# Patient Record
Sex: FEMALE | Race: BLACK OR AFRICAN AMERICAN | NOT HISPANIC OR LATINO | Employment: OTHER | URBAN - METROPOLITAN AREA
[De-identification: names, ages, dates, MRNs, and addresses within clinical notes are randomized per-mention and may not be internally consistent; named-entity substitution may affect disease eponyms.]

---

## 2023-01-26 LAB — CRC RECOMMENDATION EXT: NORMAL

## 2023-04-14 ENCOUNTER — OFFICE VISIT (OUTPATIENT)
Dept: PODIATRY | Facility: CLINIC | Age: 68
End: 2023-04-14
Payer: MEDICARE

## 2023-04-14 VITALS
SYSTOLIC BLOOD PRESSURE: 148 MMHG | HEART RATE: 79 BPM | BODY MASS INDEX: 34.8 KG/M2 | WEIGHT: 196.38 LBS | DIASTOLIC BLOOD PRESSURE: 92 MMHG | HEIGHT: 63 IN

## 2023-04-14 DIAGNOSIS — B35.3 TINEA PEDIS OF BOTH FEET: ICD-10-CM

## 2023-04-14 DIAGNOSIS — L60.9 DISEASE OF NAIL: Primary | ICD-10-CM

## 2023-04-14 DIAGNOSIS — L84 CORN OR CALLUS: ICD-10-CM

## 2023-04-14 DIAGNOSIS — M20.11 HALLUX VALGUS OF RIGHT FOOT: ICD-10-CM

## 2023-04-14 PROCEDURE — 99203 PR OFFICE/OUTPT VISIT, NEW, LEVL III, 30-44 MIN: ICD-10-PCS | Mod: S$GLB,,, | Performed by: PODIATRIST

## 2023-04-14 PROCEDURE — 99203 OFFICE O/P NEW LOW 30 MIN: CPT | Mod: S$GLB,,, | Performed by: PODIATRIST

## 2023-04-14 RX ORDER — BIOTIN 10 MG
TABLET ORAL
COMMUNITY

## 2023-04-14 RX ORDER — ASPIRIN 81 MG/1
TABLET ORAL
COMMUNITY
Start: 2023-04-05

## 2023-04-14 RX ORDER — CLOTRIMAZOLE AND BETAMETHASONE DIPROPIONATE 10; .64 MG/G; MG/G
CREAM TOPICAL 2 TIMES DAILY
Qty: 45 G | Refills: 2 | Status: SHIPPED | OUTPATIENT
Start: 2023-04-14

## 2023-04-14 RX ORDER — ACETAMINOPHEN 500 MG
TABLET ORAL
COMMUNITY

## 2023-04-14 RX ORDER — AMLODIPINE BESYLATE 10 MG/1
5 TABLET ORAL
COMMUNITY
Start: 2023-04-05 | End: 2024-04-04

## 2023-04-14 RX ORDER — LISINOPRIL AND HYDROCHLOROTHIAZIDE 12.5; 2 MG/1; MG/1
TABLET ORAL
COMMUNITY

## 2023-04-14 RX ORDER — EPINEPHRINE 0.3 MG/.3ML
0.3 INJECTION SUBCUTANEOUS
COMMUNITY
Start: 2022-09-17

## 2023-04-14 RX ORDER — ROSUVASTATIN CALCIUM 10 MG/1
10 TABLET, COATED ORAL
COMMUNITY
Start: 2023-04-05 | End: 2024-04-04

## 2023-04-14 RX ORDER — DIPHENHYDRAMINE HCL 25 MG
25 CAPSULE ORAL
COMMUNITY
Start: 2022-09-21 | End: 2023-09-21

## 2023-04-14 RX ORDER — METOPROLOL TARTRATE 25 MG/1
TABLET, FILM COATED ORAL
COMMUNITY
Start: 2023-04-05

## 2023-04-14 NOTE — PROGRESS NOTES
Subjective:     Patient ID: Jo Ann Hummel is a 67 y.o. female.    Chief Complaint: Erin Cherry is a 67 y.o. female who presents to the clinic complaining of thick and discolored toenails on both feet. Jo Ann is inquiring about treatment options.  Patient also complains of reduced motion at the right 1st MPJ secondary to previous bunion correction.  Patient also complains of dry itchy skin bilateral foot.  Patient states she is having a lot of discomfort from the growth of plantar calluses on the bottom of each foot.  Patient rates current pain 8/10.    Review of Systems   Constitutional: Negative for chills and fever.   Cardiovascular:  Negative for chest pain and leg swelling.   Respiratory:  Negative for cough and shortness of breath.    Gastrointestinal:  Negative for diarrhea, nausea and vomiting.      Objective:     Physical Exam  Vitals reviewed.   Constitutional:       General: She is not in acute distress.     Appearance: Normal appearance. She is not ill-appearing.   HENT:      Head: Normocephalic.      Nose: Nose normal.   Cardiovascular:      Rate and Rhythm: Normal rate.   Pulmonary:      Effort: Pulmonary effort is normal. No respiratory distress.   Skin:     Capillary Refill: Capillary refill takes 2 to 3 seconds.   Neurological:      Mental Status: She is alert and oriented to person, place, and time.   Psychiatric:         Mood and Affect: Mood normal.         Behavior: Behavior normal.         Thought Content: Thought content normal.         Judgment: Judgment normal.     Musculoskeletal:  5/5 muscle strength noted bilateral foot, ankle joint range of motion is mildly reduced without pain, right 1st MPJ range of motion is reduced with no pain, pain and tenderness with palpation plantar aspect bilateral 2nd MPJ in the left 5th MPJ at site of hyperkeratotic skin lesions, no masses noted bilateral foot   Vascular: DP and PT pulses palpable 1/4 bilateral foot, capillary refill time less 3  seconds to distal aspect of the digits bilateral foot, no edema noted bilateral foot  Neurologic:  Protective and light touch sensation intact bilateral lower extremity   Dermatologic:  Nails 1 through 5 bilateral are severely discolored, thickened and elongated.  Dry, flaky, erythematous skin noted in a moccasin distribution bilateral plantar foot, no interdigital maceration noted bilateral foot.  Diffuse hyperkeratotic skin lesions noted on the plantar aspect of the bilateral 2nd MPJ and left 5th MPJ, well-healed surgical scar from previous bunion correction right 1st MPJ    Assessment:      Encounter Diagnoses   Name Primary?    Disease of nail Yes    Tinea pedis of both feet     Corn or callus     Hallux valgus of right foot      Plan:     [facility]    Diagnoses and all orders for this visit:    Disease of nail    Tinea pedis of both feet  -     clotrimazole-betamethasone 1-0.05% (LOTRISONE) cream; Apply topically 2 (two) times daily.    Corn or callus    Hallux valgus of right foot      I counseled the patient on her conditions, their implications and medical management.        1. Patient was examined and evaluated.    2. Discussed patient etiology of onychomycosis.  Conservative OTC topical treatments such as Vicks vapor rub and tea tree oil were discussed in detail with the patient.  Discussed the risks and benefits oral Lamisil therapy with the patient.    3. Discussed patient etiology of tinea pedis.  Patient was advised of proper use of Lotrisone cream.    4. Discussed with patient stiffness from previous right bunion correction.  Patient was advised to continue with active and passive range of motion of the digit for improvement of flexibility.  Patient was advised to adjust shoe gear for better comfort and fit including increased toe box height and width and selection of shoe gear with soft stretchable uppers.    5. Discussed with patient etiology of plantar callus formation.   Patient was advised to apply ointments/moisturizer to the areas for softening purposes.  Patient was advised to perform safe serial debridements at home with the use of a pumice stone, nail file, emery board.  Patient was dispensed offloading aperture pads for prevention of direct contact to the area.  Patient was warned of potential recurrence over time.    6. Patient was advised to continue with comfortable shoe gear both inside and outside the home.  Patient will limit barefoot walking.  Patient will reduce the amount of use of flip-flops and slides.    7. Patient will follow-up p.r.n. for foot complaints

## 2023-12-08 ENCOUNTER — OFFICE VISIT (OUTPATIENT)
Dept: PODIATRY | Facility: CLINIC | Age: 68
End: 2023-12-08
Payer: MEDICARE

## 2023-12-08 VITALS — HEIGHT: 63 IN | BODY MASS INDEX: 34.73 KG/M2 | WEIGHT: 196 LBS

## 2023-12-08 DIAGNOSIS — M20.11 HALLUX VALGUS OF RIGHT FOOT: ICD-10-CM

## 2023-12-08 DIAGNOSIS — L84 CORN OR CALLUS: ICD-10-CM

## 2023-12-08 DIAGNOSIS — I73.9 ASYMPTOMATIC PVD (PERIPHERAL VASCULAR DISEASE): ICD-10-CM

## 2023-12-08 DIAGNOSIS — L60.9 DISEASE OF NAIL: Primary | ICD-10-CM

## 2023-12-08 PROCEDURE — 99213 OFFICE O/P EST LOW 20 MIN: CPT | Mod: S$GLB,,, | Performed by: PODIATRIST

## 2023-12-08 RX ORDER — METOPROLOL SUCCINATE 50 MG/1
50 TABLET, EXTENDED RELEASE ORAL EVERY MORNING
COMMUNITY
Start: 2023-12-06

## 2023-12-08 RX ORDER — GABAPENTIN 100 MG/1
100 CAPSULE ORAL
COMMUNITY
Start: 2023-12-07

## 2023-12-08 RX ORDER — ATORVASTATIN CALCIUM 10 MG/1
10 TABLET, FILM COATED ORAL EVERY MORNING
COMMUNITY
Start: 2023-12-06

## 2023-12-08 RX ORDER — ERGOCALCIFEROL 1.25 MG/1
50000 CAPSULE ORAL
COMMUNITY
Start: 2023-11-15

## 2023-12-08 RX ORDER — HYDROCHLOROTHIAZIDE 12.5 MG/1
12.5 CAPSULE ORAL
COMMUNITY
Start: 2023-12-07 | End: 2024-12-06

## 2024-01-08 NOTE — PROGRESS NOTES
Subjective:     Patient ID: Jo Ann Hummel is a 68 y.o. female.    Chief Complaint: Foot Pain    Jo Ann is a 68 y.o. female who presents to the podiatry clinic  with complaint of  bilateral foot pain. Onset of the symptoms was several months ago. Precipitating event: increased activity and growth of painful calluses . Current symptoms include: ability to bear weight, but with some pain. Aggravating factors: walking. Symptoms have gradually worsened. Patient has had prior foot problems. Treatment to date:  prior debridement . Patients rates pain 10/10 on pain scale.    Review of Systems   Constitutional: Negative for chills and fever.   Cardiovascular:  Positive for leg swelling. Negative for chest pain.   Respiratory:  Negative for cough and shortness of breath.    Gastrointestinal:  Negative for diarrhea, nausea and vomiting.        Objective:     Physical Exam  Vitals reviewed.   Constitutional:       General: She is not in acute distress.     Appearance: Normal appearance. She is not ill-appearing.   HENT:      Nose: Nose normal.   Cardiovascular:      Rate and Rhythm: Normal rate.   Pulmonary:      Effort: Pulmonary effort is normal. No respiratory distress.   Skin:     Capillary Refill: Capillary refill takes 2 to 3 seconds.   Neurological:      Mental Status: She is alert and oriented to person, place, and time.   Psychiatric:         Mood and Affect: Mood normal.         Behavior: Behavior normal.         Thought Content: Thought content normal.         Judgment: Judgment normal.     Musculoskeletal:  5/5 muscle strength noted bilateral foot, ankle joint range of motion is mildly reduced without pain, right 1st MPJ range of motion is reduced with no pain, pain and tenderness with palpation plantar aspect bilateral 2nd MPJ in the left 5th MPJ at site of hyperkeratotic skin lesions, no masses noted bilateral foot   Vascular: DP and PT pulses palpable 1/4 bilateral foot, capillary refill time less 3 seconds  to distal aspect of the digits bilateral foot, no edema noted bilateral foot  Neurologic:  Protective and light touch sensation intact bilateral lower extremity   Dermatologic:  Nails 1 through 5 bilateral are severely discolored, thickened and elongated. no interdigital maceration noted bilateral foot.  hyperkeratotic skin lesions noted on the plantar aspect of the bilateral 2nd MPJ and left 5th MPJ, well-healed surgical scar from previous bunion correction right 1st MPJ      Assessment:       Disease of nail    Corn or callus    Hallux valgus of right foot    Asymptomatic PVD (peripheral vascular disease)      Plan:     Jo Ann was seen today for foot pain.    Diagnoses and all orders for this visit:    Disease of nail    Corn or callus    Hallux valgus of right foot    Asymptomatic PVD (peripheral vascular disease)      I counseled the patient on her conditions, their implications and medical management.        1. Patient was examined and evaluated.    2. Again Discussed with patient etiology of onychomycosis.  Conservative OTC topical treatments such as Vicks vapor rub and tea tree oil were discussed in detail with the patient.  Discussed the risks and benefits oral Lamisil therapy with the patient.    3. Discussed with patient stiffness from previous right bunion correction.  Patient was advised to continue with active and passive range of motion of the digit for improvement of flexibility.  Patient was advised to adjust shoe gear for better comfort and fit including increased toe box height and width and selection of shoe gear with soft stretchable uppers.    4. Again Discussed with patient etiology of plantar callus formation.  Patient was advised to apply ointments/moisturizer to the areas for softening purposes.  Patient was advised to perform safe debridement at home.  Patient was dispensed offloading aperture pads for prevention of direct contact to the area.  Patient was warned of potential recurrence  over time.    6. Patient was advised to continue with comfortable shoe gear both inside and outside the home.  Patient will limit barefoot walking.  Patient will reduce the amount of use of flip-flops and slides.    7. Patient will follow-up p.r.n. for foot complaints

## 2024-03-14 ENCOUNTER — PATIENT OUTREACH (OUTPATIENT)
Dept: ADMINISTRATIVE | Facility: HOSPITAL | Age: 69
End: 2024-03-14
Payer: MEDICARE

## 2024-03-14 NOTE — PROGRESS NOTES
Population Health Chart Review & Patient Outreach Details      Additional [facility] Health Notes:               Updates Requested / Reviewed:      Care Everywhere, , External Sources: [place], and Care Team Updated         Health Maintenance Topics Overdue:      VBHM Score: 0     Patient is not due for any topics at this time.    Influenza Vaccine  Pneumonia Vaccine  Shingles/Zoster Vaccine  RSV Vaccine                  Health Maintenance Topic(s) Outreach Outcomes & Actions Taken:    Colorectal Cancer Screening - Outreach Outcomes & Actions Taken  : External Records Uploaded, Care Team Updated, & History Updated if Applicable

## 2024-05-17 ENCOUNTER — OFFICE VISIT (OUTPATIENT)
Dept: PODIATRY | Facility: CLINIC | Age: 69
End: 2024-05-17
Payer: MEDICARE

## 2024-05-17 VITALS — BODY MASS INDEX: 33.66 KG/M2 | HEIGHT: 63 IN | WEIGHT: 190 LBS

## 2024-05-17 DIAGNOSIS — S93.601A FOOT SPRAIN, RIGHT, INITIAL ENCOUNTER: Primary | ICD-10-CM

## 2024-05-17 PROCEDURE — 29580 STRAPPING UNNA BOOT: CPT | Mod: RT,S$GLB,, | Performed by: PODIATRIST

## 2024-05-17 PROCEDURE — 99213 OFFICE O/P EST LOW 20 MIN: CPT | Mod: 25,S$GLB,, | Performed by: PODIATRIST

## 2024-05-17 RX ORDER — FUROSEMIDE 20 MG/1
20 TABLET ORAL
COMMUNITY
Start: 2024-04-11 | End: 2025-04-11

## 2024-05-17 RX ORDER — POTASSIUM CHLORIDE 750 MG/1
10 TABLET, EXTENDED RELEASE ORAL EVERY MORNING
COMMUNITY
Start: 2024-04-11

## 2024-05-17 RX ORDER — TRIAMCINOLONE ACETONIDE 1 MG/G
CREAM TOPICAL 2 TIMES DAILY
COMMUNITY
Start: 2024-01-08

## 2024-05-17 RX ORDER — GABAPENTIN 300 MG/1
300 CAPSULE ORAL 2 TIMES DAILY PRN
COMMUNITY
Start: 2024-04-11

## 2024-05-28 ENCOUNTER — OFFICE VISIT (OUTPATIENT)
Dept: PODIATRY | Facility: CLINIC | Age: 69
End: 2024-05-28
Payer: MEDICARE

## 2024-05-28 VITALS — BODY MASS INDEX: 33.66 KG/M2 | HEIGHT: 63 IN | WEIGHT: 190 LBS

## 2024-05-28 DIAGNOSIS — S93.601D FOOT SPRAIN, RIGHT, SUBSEQUENT ENCOUNTER: Primary | ICD-10-CM

## 2024-05-28 PROCEDURE — 99213 OFFICE O/P EST LOW 20 MIN: CPT | Mod: S$GLB,,, | Performed by: PODIATRIST

## 2024-05-28 RX ORDER — LIDOCAINE 50 MG/G
1 PATCH TOPICAL
COMMUNITY

## 2024-06-10 NOTE — PROCEDURES
Procedures  UNNA BOOT APPLICATION RIGHT LOWER EXTREMITY    Patient had a 4 in unna boot elastic conforming bandage applied to the right lower extremity from just proximal to the metatarsophalangeal joint region extending proximally in his when she will compressive manner to above the ankle.  Next patient had roll gauze and CoFlex applied in a similar yet non restrictive manner.  Patient was advised to keep this dressing clean, dry, and intact for 1-3 days.

## 2024-06-10 NOTE — PROGRESS NOTES
Subjective:     Patient ID: Jo Ann Hummel is a 69 y.o. female.    Chief Complaint: Foot Pain    Jo Ann is a 69 y.o. female who presents to the podiatry clinic  with complaint of  right foot pain. Onset of the symptoms was several days ago. Precipitating event: injured right foot . Current symptoms include: ability to bear weight, but with some pain, swelling, and worsening symptoms after a period of activity. Aggravating factors: standing and walking. Symptoms have gradually improved. Patient has had prior foot problems.  Treatment to date: OTC analgesics which are somewhat effective and rest. Patients rates pain 2/10 on pain scale.    Review of Systems   Constitutional: Negative for chills and fever.   Cardiovascular:  Negative for chest pain and leg swelling.   Respiratory:  Negative for cough and shortness of breath.    Gastrointestinal:  Negative for diarrhea, nausea and vomiting.        Objective:     Physical Exam  Vitals reviewed.   Constitutional:       General: She is not in acute distress.     Appearance: Normal appearance. She is not ill-appearing.   HENT:      Head: Normocephalic.      Nose: Nose normal.   Pulmonary:      Effort: Pulmonary effort is normal. No respiratory distress.   Skin:     Capillary Refill: Capillary refill takes 2 to 3 seconds.   Neurological:      Mental Status: She is alert and oriented to person, place, and time.   Psychiatric:         Mood and Affect: Mood normal.         Behavior: Behavior normal.         Thought Content: Thought content normal.         Judgment: Judgment normal.       Musculoskeletal:  5/5 muscle strength noted bilateral foot, ankle joint range of motion is mildly reduced without pain, right 1st MPJ range of motion is reduced with no pain, pain and tenderness with palpation plantar aspect bilateral 2nd MPJ in the left 5th MPJ at site of hyperkeratotic skin lesions, no masses noted bilateral foot, pain and tenderness with range of motion and palpation of  the right foot and inversion eversion and dorsiflexion plantar flexion   Vascular: DP and PT pulses palpable 1/4 bilateral foot, capillary refill time less 3 seconds to distal aspect of the digits bilateral foot, no edema noted bilateral foot  Neurologic:  Protective and light touch sensation intact bilateral lower extremity   Dermatologic:  Nails 1 through 5 bilateral are severely discolored, thickened and elongated. no interdigital maceration noted bilateral foot.  hyperkeratotic skin lesions noted on the plantar aspect of the bilateral 2nd MPJ and left 5th MPJ, well-healed surgical scar from previous bunion correction right 1st MPJ, mild swelling noted to the right foot and ankle    Assessment:      Encounter Diagnosis   Name Primary?    Foot sprain, right, initial encounter Yes     Plan:     Jo Ann was seen today for foot pain.    Diagnoses and all orders for this visit:    Foot sprain, right, initial encounter      I counseled the patient on her conditions, their implications and medical management.        1. Patient was examined and evaluated.    2. Discussed patient etiology of foot strain to the right foot.  Patient was advised to decrease the amount of activity and to perform DUANE CE therapy at home.  Discussed with patient potential benefits of local corticosteroid injection oral Medrol Dosepak shoe pain complaints worsen over time.  Patient will continue to adjunct with OTC analgesics/NSAIDs for pain relief.    3. Patient had a unna boot applied to the right lower extremity.  Patient will keep this dressing clean, dry, and intact for 1-3 days.    4. Patient will follow-up in 1 week or p.r.n. for complaints

## 2024-06-18 NOTE — PROGRESS NOTES
Subjective:     Patient ID: Jo Ann Hummel is a 69 y.o. female.    Chief Complaint: Follow-up    Jo Ann is a 69 y.o. female who presents to the podiatry clinic  with complaint of  right foot pain.  Onset of the symptoms was several days ago. Precipitating event: injured right foot . Current symptoms include: ability to bear weight, but with some pain, swelling, and worsening symptoms after a period of activity. Aggravating factors: standing and walking. Symptoms have gradually improved. Patient has had prior foot problems.  Treatment to date: OTC analgesics which are somewhat effective , unna boot, and PRICE therapy. Patients rates pain 0/10 on pain scale.       Review of Systems   Constitutional: Negative for chills and fever.   Cardiovascular:  Positive for leg swelling. Negative for chest pain.   Respiratory:  Negative for cough and shortness of breath.    Gastrointestinal:  Negative for diarrhea, nausea and vomiting.        Objective:     Physical Exam  Vitals reviewed.   Constitutional:       General: She is not in acute distress.     Appearance: Normal appearance. She is not ill-appearing.   HENT:      Nose: Nose normal.   Cardiovascular:      Rate and Rhythm: Normal rate.   Pulmonary:      Effort: Pulmonary effort is normal. No respiratory distress.   Skin:     Capillary Refill: Capillary refill takes 2 to 3 seconds.   Neurological:      Mental Status: She is alert and oriented to person, place, and time.   Psychiatric:         Mood and Affect: Mood normal.         Behavior: Behavior normal.         Thought Content: Thought content normal.         Judgment: Judgment normal.       Musculoskeletal:  5/5 muscle strength noted bilateral foot, ankle joint range of motion is mildly reduced without pain, right 1st MPJ range of motion is reduced with no pain, mild to minimal pain and tenderness with palpation plantar aspect bilateral 2nd MPJ in the left 5th MPJ at site of hyperkeratotic skin lesions, no masses  noted bilateral foot, NO pain and tenderness with range of motion and palpation of the right foot and inversion eversion and dorsiflexion plantar flexion   Vascular: DP and PT pulses palpable 1/4 bilateral foot, capillary refill time less 3 seconds to distal aspect of the digits bilateral foot, no edema noted bilateral foot  Neurologic:  Protective and light touch sensation intact bilateral lower extremity   Dermatologic:  Nails 1 through 5 bilateral are severely discolored, thickened and elongated. no interdigital maceration noted bilateral foot.  hyperkeratotic skin lesions noted on the plantar aspect of the bilateral 2nd MPJ and left 5th MPJ, well-healed surgical scar from previous bunion correction right 1st MPJ, mild swelling noted to the right foot and ankle    Assessment:      Encounter Diagnosis   Name Primary?    Foot sprain, right, subsequent encounter Yes     Plan:     Jo Ann was seen today for follow-up.    Diagnoses and all orders for this visit:    Foot sprain, right, subsequent encounter      I counseled the patient on her conditions, their implications and medical management.          1. Patient was examined and evaluated.    2. Again Discussed with patient etiology of foot strain to the right foot.  Patient was advised to decrease the amount of activity and to perform DUANE CE therapy at home.  Discussed with patient potential benefits of local corticosteroid injection oral Medrol Dosepak shoe pain complaints worsen over time.  Patient will continue to adjunct with OTC analgesics/NSAIDs for pain relief.    3. Patient will follow-up p.r.n. for complaints

## 2024-08-22 ENCOUNTER — OFFICE VISIT (OUTPATIENT)
Dept: PODIATRY | Facility: CLINIC | Age: 69
End: 2024-08-22
Payer: MEDICARE

## 2024-08-22 VITALS — HEIGHT: 63 IN | WEIGHT: 190 LBS | BODY MASS INDEX: 33.66 KG/M2

## 2024-08-22 DIAGNOSIS — L84 CORN OR CALLUS: Primary | ICD-10-CM

## 2024-08-22 DIAGNOSIS — B07.0 PLANTAR WART: ICD-10-CM

## 2024-08-22 PROCEDURE — 99213 OFFICE O/P EST LOW 20 MIN: CPT | Mod: S$GLB,,, | Performed by: PODIATRIST

## 2024-08-22 NOTE — PROGRESS NOTES
Subjective:     Patient ID: Jo Ann Hummel is a 69 y.o. female    Chief Complaint: Erin Cherry is a 69 y.o. female who presents to the podiatry clinic  with complaint of  right foot pain. Onset of the symptoms was several weeks ago. Precipitating event: increased activity and regrowth of painful plantar soft tissue lesion . Current symptoms include: ability to bear weight, but with some pain and worsening symptoms after a period of activity. Aggravating factors: walking and barefoot walking and direct contact to soft tissue lesion . Symptoms have waxed and waned but are better overall. Patient has had prior foot problems.  Treatment to date: avoidance of offending activity and prior debridement of soft tissue lesion . Patients rates pain 4/10 on pain scale.    Review of Systems   Constitutional: Negative.  Negative for chills and fever.   Respiratory:  Negative for cough and shortness of breath.    Cardiovascular:  Negative for chest pain and leg swelling.   Gastrointestinal:  Negative for diarrhea, nausea and vomiting.   Neurological:  Negative for tingling.        Objective:   Physical Exam  Vitals reviewed.   Constitutional:       General: She is not in acute distress.     Appearance: Normal appearance. She is not ill-appearing.   HENT:      Head: Normocephalic.      Nose: Nose normal.   Cardiovascular:      Pulses:           Dorsalis pedis pulses are 2+ on the right side and 2+ on the left side.        Posterior tibial pulses are 2+ on the right side and 2+ on the left side.   Pulmonary:      Effort: Pulmonary effort is normal. No respiratory distress.   Musculoskeletal:      Right foot: Bunion present.   Feet:      Right foot:      Skin integrity: Callus present.   Skin:     Capillary Refill: Capillary refill takes 2 to 3 seconds.   Neurological:      Mental Status: She is alert and oriented to person, place, and time.   Psychiatric:         Mood and Affect: Mood normal.         Behavior: Behavior  normal.         Thought Content: Thought content normal.         Judgment: Judgment normal.        Foot Exam    General  General Appearance: appears stated age and healthy   Orientation: alert and oriented to person, place, and time   Affect: appropriate       Right Foot/Ankle     Inspection and Palpation  Hammertoes: second toe  Hallux valgus: yes  Skin Exam: callus and corn (plantar right 2nd MPJ);     Neurovascular  Dorsalis pedis: 2+  Posterior tibial: 2+    Muscle Strength  Ankle dorsiflexion: 5  Ankle plantar flexion: 5  Ankle inversion: 5  Ankle eversion: 5  Great toe extension: 5  Great toe flexion: 5      Left Foot/Ankle      Neurovascular  Dorsalis pedis: 2+  Posterior tibial: 2+    Muscle Strength  Ankle dorsiflexion: 5  Ankle plantar flexion: 5  Ankle inversion: 5  Ankle eversion: 5  Great toe extension: 5  Great toe flexion: 5           Assessment:         1. Corn or callus    2. Plantar wart       Plan:     Jo Ann Hummel was seen today for   Chief Complaint   Patient presents with    Callouses       Assessment & Plan           Procedures     1. Patient was examined and evaluated.    2. Discussed with patient etiology of IPK versus verruca versus plantar calluses. Discussed with patient etiology of callus formation.  Patient was warned of potential recurrence over time.  Patient was dispensed offloading pads for reduction of direct contact to the lesion.  Patient was advised to perform safe debridement at home with a emery board, nail file or pumice stone.  Patient was advised to apply ointments / moisturizer to the area for softening purposes. Discussed with patient etiology of plantar verruca.  Discussed with patient several conservative treatment options for verruca including cryotherapy, surgical excision, cauterization and debridement with topical acid treatments.  Patient was warned of potential recurrence over time.  Patient was dispensed offloading aperture pads for prevention of direct  contact.    3. Patient was advised to decrease the amount of use of backless shoes, slides, and flip-flops.  Patient will also limit barefoot walking.  4. Discussed with patient the etiology of nail fungus and as possible secondary issue to prior nail trauma.  Discussed with patient OTC topical conservative treatments such as Vicks vapor rub, tea tree oil as well as coconut oil.  Discussed with patient risks and benefits oral Lamisil therapy.   5. Patient was advised to discontinue use of fake toenails on right 1st digit.  6. Discussed with patient etiology of bunion deformity/hallux abductovalgus.  Discussed conservative treatment options with the patient.  Patient was advised to adjust shoe gear for better comfort and fit including increased toe box height and width and selection shoe gear with soft stretchable uppers.  Discussed with patient potential benefits of a local corticosteroid injection or oral Medrol Dosepak for improvement of pain and inflammation.  Patient was advised that she can adjunct with NSAIDs for pain relief as well.  Briefly discussed with patient surgical intervention including Ozzie bunionectomy.    Patient was advised of the etiology of hammertoe deformity.  Patient was advised to adjust shoe gear for better comfort and fit including increased toe box height and width and selection shoe gear with soft stretchable uppers.  7. Patient will follow-up p.r.n. for complaints          Lola Mendoza DPM  [id number] [id number] [phone]      This note was created using [employer] direct voice recognition software. Note may have occasional typographical errors that may not have been identified and edited despite initial review prior to signing.

## 2024-11-21 ENCOUNTER — OFFICE VISIT (OUTPATIENT)
Dept: PODIATRY | Facility: CLINIC | Age: 69
End: 2024-11-21
Payer: MEDICARE

## 2024-11-21 VITALS — WEIGHT: 190 LBS | BODY MASS INDEX: 33.66 KG/M2 | HEIGHT: 63 IN

## 2024-11-21 DIAGNOSIS — I73.9 ASYMPTOMATIC PVD (PERIPHERAL VASCULAR DISEASE): ICD-10-CM

## 2024-11-21 DIAGNOSIS — L60.9 DISEASE OF NAIL: Primary | ICD-10-CM

## 2024-11-21 DIAGNOSIS — L84 CORN OR CALLUS: ICD-10-CM

## 2024-11-21 RX ORDER — SEMAGLUTIDE 0.25 MG/.5ML
0.25 INJECTION, SOLUTION SUBCUTANEOUS
COMMUNITY
Start: 2024-11-07

## 2024-11-21 NOTE — PROGRESS NOTES
Subjective:     Patient ID: Jo Ann Hummel is a 69 y.o. female    Chief Complaint: Nail Care       Jo Ann is a 69 y.o. female who presents to the clinic for evaluation and treatment of high risk feet. Jo Ann has a past medical history of Personal history of colonic polyps. The patient's chief complaint is long, thick toenails. This patient has documented high risk feet requiring routine maintenance secondary to peripheral vascular disease.    PCP: Cristi Locke MD    Date Last Seen by PCP: 10/03/2024    Current shoe gear:  Affected Foot: Tennis shoes     Unaffected Foot: Tennis shoes    Last encounter in this department: 8/22/2024    Hemoglobin A1C   Date Value Ref Range Status   02/29/2024 5.8 (H) <5.7 % Final     Comment:       Prediabetic: 5.7 - 6.4 %  Diabetic: > 6.4 %   01/06/2023 6.0 5.7 - 6.4 % Final       Review of Systems   Constitutional: Negative.  Negative for chills and fever.   Respiratory:  Negative for cough and shortness of breath.    Cardiovascular:  Negative for chest pain and leg swelling.   Gastrointestinal:  Negative for diarrhea, nausea and vomiting.   Neurological:  Positive for tingling.        Objective:   Physical Exam  Vitals reviewed.   Constitutional:       General: She is not in acute distress.     Appearance: Normal appearance. She is not ill-appearing.   HENT:      Head: Normocephalic.      Nose: Nose normal.   Cardiovascular:      Pulses:           Dorsalis pedis pulses are 1+ on the right side and 1+ on the left side.        Posterior tibial pulses are 1+ on the right side and 1+ on the left side.   Pulmonary:      Effort: Pulmonary effort is normal. No respiratory distress.   Feet:      Right foot:      Skin integrity: Callus (plantar right 2nd MPJ) present.      Left foot:      Skin integrity: Callus (plantar left 2nd MPJ) present.   Skin:     Capillary Refill: Capillary refill takes 2 to 3 seconds.   Neurological:      Mental Status: She is alert and oriented to  person, place, and time.   Psychiatric:         Mood and Affect: Mood normal.         Behavior: Behavior normal.         Thought Content: Thought content normal.         Judgment: Judgment normal.        Foot Exam    General  General Appearance: appears stated age and healthy   Orientation: alert and oriented to person, place, and time   Affect: appropriate       Right Foot/Ankle     Inspection and Palpation  Tenderness: lesser metatarsophalangeal joints   Swelling: dorsum   Skin Exam: callus (plantar right 2nd MPJ), skin changes and abnormal color;     Neurovascular  Dorsalis pedis: 1+  Posterior tibial: 1+    Muscle Strength  Ankle dorsiflexion: 5  Ankle plantar flexion: 5  Ankle inversion: 5  Ankle eversion: 5  Great toe extension: 5  Great toe flexion: 5      Left Foot/Ankle      Inspection and Palpation  Swelling: dorsum   Skin Exam: callus (plantar left 2nd MPJ), skin changes and abnormal color;     Neurovascular  Dorsalis pedis: 1+  Posterior tibial: 1+    Muscle Strength  Ankle dorsiflexion: 5  Ankle plantar flexion: 5  Ankle inversion: 5  Ankle eversion: 5  Great toe extension: 5  Great toe flexion: 5        Vascular:  Pedal hair growth is absent to the digits bilateral lower extremity, skin temperature gradient warm to cool from proximal distal bilateral lower extremity, mild atrophic soft tissue skin changes noted bilateral foot,  mild edema noted bilateral foot  Neurologic:  Protective and light touch sensation intact bilateral lower extremity   Dermatologic:  Nails 1 through 5 bilateral are severely discolored, thickened and elongated. hyperkeratotic skin lesions noted on the plantar aspect of the bilateral 2nd MPJ and left 5th MPJ, well-healed surgical scar from previous bunion correction right 1st MPJ    Assessment:         1. Disease of nail    2. Asymptomatic PVD (peripheral vascular disease)       Plan:     Jo Ann Hummel was seen today for   Chief Complaint   Patient presents with    Nail Care        Assessment & Plan           Routine Foot Care    Date/Time: 11/21/2024 1:30 PM    Performed by: Bonnie Mendoza DPM  Authorized by: Bonnie Mendoza DPM    Consent Done?:  Yes (Verbal)  Hyperkeratotic Skin Lesions?: Yes    Number of trimmed lesions:  2  Location(s):  Left 2nd Metatarsal Head and Right 2nd Metatarsal Head    Nail Care Type:  Debride(Left 1st Toe, Left 5th Toe, Left 2nd Toe, Left 3rd Toe, Right 2nd Toe, Left 4th Toe, Right 3rd Toe, Right 4th Toe and Right 5th Toe)  Patient tolerance:  Patient tolerated the procedure well with no immediate complications       1. Patient was examined and evaluated.    2. Discussed with patient etiology of asymptomatic peripheral vascular disease.  Patient was advised elevate lower extremity rest consider daily use of compression stockings. Patient will monitor dietary salt intake and water consumption.    3. Discussed with patient etiology of callus formation.  Patient was warned of potential recurrence over time.  Patient was dispensed offloading pads for reduction of direct contact to the lesion.  Patient was advised to perform safe debridement at home with a emery board, nail file or pumice stone.  Patient was advised to apply ointments / moisturizer to the area for softening purposes.  4. Discussed with patient the etiology of nail fungus and as possible secondary issue to prior nail trauma.  Discussed with patient OTC topical conservative treatments such as Vicks vapor rub, tea tree oil as well as coconut oil.  Discussed with patient risks and benefits oral Lamisil therapy.  Discussed benefits of prescription solutions Penlac and Jublia.  Patient was encouraged to be very careful with application of false nail plates to the foot with a current false nail plate on the right 1st digit  5. Patient will follow-up in 3 months or p.r.n. for complaints         Bonnie Mendoza DPM  [address] [facility] [address]  [place] [address]  [phone]      This note  was created using [employer] direct voice recognition software. Note may have occasional typographical errors that may not have been identified and edited despite initial review prior to signing.

## 2024-12-02 ENCOUNTER — APPOINTMENT (RX ONLY)
Dept: URBAN - METROPOLITAN AREA CLINIC 79 | Facility: CLINIC | Age: 69
Setting detail: DERMATOLOGY
End: 2024-12-02

## 2024-12-02 DIAGNOSIS — L82.0 INFLAMED SEBORRHEIC KERATOSIS: ICD-10-CM

## 2024-12-02 DIAGNOSIS — D18.0 HEMANGIOMA: ICD-10-CM

## 2024-12-02 DIAGNOSIS — L82.1 OTHER SEBORRHEIC KERATOSIS: ICD-10-CM

## 2024-12-02 DIAGNOSIS — Z85.828 PERSONAL HISTORY OF OTHER MALIGNANT NEOPLASM OF SKIN: ICD-10-CM

## 2024-12-02 DIAGNOSIS — L81.4 OTHER MELANIN HYPERPIGMENTATION: ICD-10-CM

## 2024-12-02 DIAGNOSIS — D22 MELANOCYTIC NEVI: ICD-10-CM

## 2024-12-02 PROBLEM — D18.01 HEMANGIOMA OF SKIN AND SUBCUTANEOUS TISSUE: Status: ACTIVE | Noted: 2024-12-02

## 2024-12-02 PROBLEM — D22.5 MELANOCYTIC NEVI OF TRUNK: Status: ACTIVE | Noted: 2024-12-02

## 2024-12-02 PROBLEM — D48.5 NEOPLASM OF UNCERTAIN BEHAVIOR OF SKIN: Status: ACTIVE | Noted: 2024-12-02

## 2024-12-02 PROCEDURE — 11102 TANGNTL BX SKIN SINGLE LES: CPT

## 2024-12-02 PROCEDURE — ? BIOPSY BY SHAVE METHOD

## 2024-12-02 PROCEDURE — ? COUNSELING

## 2024-12-02 PROCEDURE — ? SUNSCREEN RECOMMENDATIONS

## 2024-12-02 PROCEDURE — 99203 OFFICE O/P NEW LOW 30 MIN: CPT | Mod: 25

## 2024-12-02 ASSESSMENT — LOCATION SIMPLE DESCRIPTION DERM
LOCATION SIMPLE: RIGHT UPPER BACK
LOCATION SIMPLE: RIGHT BACK
LOCATION SIMPLE: LEFT UPPER BACK

## 2024-12-02 ASSESSMENT — LOCATION ZONE DERM: LOCATION ZONE: TRUNK

## 2024-12-02 ASSESSMENT — LOCATION DETAILED DESCRIPTION DERM
LOCATION DETAILED: LEFT MID-UPPER BACK
LOCATION DETAILED: RIGHT SUPERIOR MEDIAL UPPER BACK
LOCATION DETAILED: LEFT SUPERIOR UPPER BACK
LOCATION DETAILED: RIGHT MID-UPPER BACK
LOCATION DETAILED: RIGHT SUPERIOR LATERAL UPPER BACK

## 2024-12-02 NOTE — PROCEDURE: BIOPSY BY SHAVE METHOD
Detail Level: Detailed
Depth Of Biopsy: dermis
Was A Bandage Applied: Yes
Size Of Lesion In Cm: 1
X Size Of Lesion In Cm: 0
Biopsy Type: H and E
Biopsy Method: Personna blade
Anesthesia Type: 1% lidocaine with epinephrine
Anesthesia Volume In Cc: 0.5
Hemostasis: Rodríguez's
Wound Care: Petrolatum
Dressing: bandage
Destruction After The Procedure: No
Type Of Destruction Used: Curettage
Curettage Text: The wound bed was treated with curettage after the biopsy was performed.
Cryotherapy Text: The wound bed was treated with cryotherapy after the biopsy was performed.
Electrodesiccation Text: The wound bed was treated with electrodesiccation after the biopsy was performed.
Electrodesiccation And Curettage Text: The wound bed was treated with electrodesiccation and curettage after the biopsy was performed.
Silver Nitrate Text: The wound bed was treated with silver nitrate after the biopsy was performed.
Lab: 6
Lab Facility: [id number]
Consent: Written consent was obtained and risks were reviewed including but not limited to scarring, infection, bleeding, scabbing, incomplete removal, nerve damage and allergy to anesthesia.
Post-Care Instructions: I reviewed with the patient in detail post-care instructions. Patient is to keep the biopsy site dry overnight, and then apply bacitracin twice daily until healed. Patient may apply hydrogen peroxide soaks to remove any crusting.
Notification Instructions: Patient will be notified of biopsy results. However, patient instructed to call the office if not contacted within 2 weeks.
Billing Type: Third-Party Bill
Information: Selecting Yes will display possible errors in your note based on the variables you have selected. This validation is only offered as a suggestion for you. PLEASE NOTE THAT THE VALIDATION TEXT WILL BE REMOVED WHEN YOU FINALIZE YOUR NOTE. IF YOU WANT TO FAX A PRELIMINARY NOTE YOU WILL NEED TO TOGGLE THIS TO 'NO' IF YOU DO NOT WANT IT IN YOUR FAXED NOTE.

## 2024-12-02 NOTE — PROCEDURE: SUNSCREEN RECOMMENDATIONS

## 2025-02-21 ENCOUNTER — OFFICE VISIT (OUTPATIENT)
Dept: PODIATRY | Facility: CLINIC | Age: 70
End: 2025-02-21
Payer: MEDICARE

## 2025-02-21 VITALS — HEIGHT: 63 IN | BODY MASS INDEX: 32.46 KG/M2 | WEIGHT: 183.19 LBS

## 2025-02-21 DIAGNOSIS — L60.9 DISEASE OF NAIL: ICD-10-CM

## 2025-02-21 DIAGNOSIS — E11.49 TYPE II DIABETES MELLITUS WITH NEUROLOGICAL MANIFESTATIONS: ICD-10-CM

## 2025-02-21 DIAGNOSIS — L84 CORN OR CALLUS: ICD-10-CM

## 2025-02-21 DIAGNOSIS — E11.9 COMPREHENSIVE DIABETIC FOOT EXAMINATION, TYPE 2 DM, ENCOUNTER FOR: Primary | ICD-10-CM

## 2025-02-21 PROCEDURE — 11721 DEBRIDE NAIL 6 OR MORE: CPT | Mod: 59,Q9,S$GLB, | Performed by: PODIATRIST

## 2025-02-21 PROCEDURE — 11056 PARNG/CUTG B9 HYPRKR LES 2-4: CPT | Mod: Q9,S$GLB,, | Performed by: PODIATRIST

## 2025-02-21 PROCEDURE — 99213 OFFICE O/P EST LOW 20 MIN: CPT | Mod: 25,S$GLB,, | Performed by: PODIATRIST

## 2025-02-21 RX ORDER — KETOCONAZOLE 20 MG/G
CREAM TOPICAL
COMMUNITY
Start: 2024-12-04

## 2025-02-21 RX ORDER — METFORMIN HYDROCHLORIDE 500 MG/1
TABLET, EXTENDED RELEASE ORAL
COMMUNITY
Start: 2024-12-11

## 2025-02-21 RX ORDER — ONDANSETRON 4 MG/1
4 TABLET, ORALLY DISINTEGRATING ORAL EVERY 8 HOURS PRN
COMMUNITY
Start: 2024-12-31

## 2025-03-10 NOTE — PROGRESS NOTES
Subjective:     Patient ID: Jo Ann Hummel is a 69 y.o. female    Chief Complaint: Nail Care       Jo Ann is a 69 y.o. female who presents to the clinic for evaluation and treatment of high risk feet. Jo Ann has a past medical history of Personal history of colonic polyps. The patient's chief complaint is long, thick toenails. This patient has documented high risk feet requiring routine maintenance secondary to peripheral neuropathy.    PCP: Cristi Locke MD    Date Last Seen by PCP:  12/04/2024    Current shoe gear:  Affected Foot: Casual shoes     Unaffected Foot: Casual shoes    Hemoglobin A1C   Date Value Ref Range Status   02/29/2024 5.8 (H) <5.7 % Final     Comment:       Prediabetic: 5.7 - 6.4 %  Diabetic: > 6.4 %   01/06/2023 6.0 5.7 - 6.4 % Final       Review of Systems   Constitutional: Negative.  Negative for chills and fever.   Respiratory:  Negative for cough and shortness of breath.    Cardiovascular:  Positive for leg swelling. Negative for chest pain.   Gastrointestinal:  Negative for diarrhea, nausea and vomiting.   Neurological:  Positive for tingling.        Objective:   Physical Exam  Vitals reviewed.   Constitutional:       General: She is not in acute distress.     Appearance: Normal appearance. She is not ill-appearing.   HENT:      Head: Normocephalic.      Nose: Nose normal.   Cardiovascular:      Pulses:           Dorsalis pedis pulses are 1+ on the right side and 1+ on the left side.        Posterior tibial pulses are 1+ on the right side and 1+ on the left side.   Pulmonary:      Effort: Pulmonary effort is normal. No respiratory distress.   Feet:      Right foot:      Skin integrity: Callus (plantar right 2nd MPJ) present.      Left foot:      Skin integrity: Callus (plantar left 3rd MPJ) present.   Skin:     Capillary Refill: Capillary refill takes 2 to 3 seconds.   Neurological:      Mental Status: She is alert and oriented to person, place, and time.   Psychiatric:          Mood and Affect: Mood normal.         Behavior: Behavior normal.         Thought Content: Thought content normal.         Judgment: Judgment normal.        Foot Exam    General  General Appearance: appears stated age and healthy   Orientation: alert and oriented to person, place, and time   Affect: appropriate   Gait: antalgic       Right Foot/Ankle     Inspection and Palpation  Tenderness: lesser metatarsophalangeal joints   Swelling: dorsum   Arch: normal  Skin Exam: callus (plantar right 2nd MPJ), skin changes and abnormal color;     Neurovascular  Dorsalis pedis: 1+  Posterior tibial: 1+    Muscle Strength  Ankle dorsiflexion: 5  Ankle plantar flexion: 5  Ankle inversion: 5  Ankle eversion: 5  Great toe extension: 5  Great toe flexion: 5      Left Foot/Ankle      Inspection and Palpation  Tenderness: lesser metatarsophalangeal joints   Swelling: dorsum   Arch: normal  Skin Exam: callus (plantar left 3rd MPJ);     Neurovascular  Dorsalis pedis: 1+  Posterior tibial: 1+    Muscle Strength  Ankle dorsiflexion: 5  Ankle plantar flexion: 5  Ankle inversion: 5  Ankle eversion: 5  Great toe extension: 5  Great toe flexion: 5      Vascular:  Pedal hair growth is absent to the digits bilateral lower extremity, skin temperature gradient warm to cool from proximal distal bilateral lower extremity, mild atrophic soft tissue skin changes noted bilateral foot,  mild edema noted bilateral foot  Neurologic:  Positive paresthesias reported, positive burning reported  Dermatologic:  Nails 1 through 5 bilateral are severely discolored, thickened and elongated.  well-healed surgical scar from previous bunion correction right 1st MPJ     Assessment:         1. Comprehensive diabetic foot examination, type 2 DM, encounter for    2. Disease of nail    3. Type II diabetes mellitus with neurological manifestations    4. Corn or callus       Plan:     Jo Ann Hummel was seen today for   Chief Complaint   Patient presents with    Nail  Care       Assessment & Plan           Routine Foot Care    Date/Time: 2/21/2025 2:00 PM    Performed by: Bonnie Mendoza DPM  Authorized by: Bonnie Mendoza DPM    Consent Done?:  Yes (Verbal)  Hyperkeratotic Skin Lesions?: Yes    Number of trimmed lesions:  2  Location(s):  Left 3rd Metatarsal Head and Right 2nd Metatarsal Head    Nail Care Type:  Debride  Location(s): All  (Left 1st Toe, Left 3rd Toe, Left 2nd Toe, Left 4th Toe, Left 5th Toe, Right 1st Toe, Right 2nd Toe, Right 3rd Toe, Right 4th Toe and Right 5th Toe)  Patient tolerance:  Patient tolerated the procedure well with no immediate complications       1. Patient was examined and evaluated.    2. Discussed with patient etiology of callus formation.  Patient was warned of potential recurrence over time.  Patient was dispensed offloading pads for reduction of direct contact to the lesion.  Patient was advised to perform safe debridement at home with a emery board, nail file or pumice stone.  Patient was advised to apply ointments / moisturizer to the area for softening purposes.  3. Discussed with patient the etiology of nail fungus and as possible secondary issue to prior nail trauma.  Discussed with patient OTC topical conservative treatments such as Vicks vapor rub, tea tree oil as well as coconut oil.  Discussed with patient risks and benefits oral Lamisil therapy.   4. Patient was advised to continue with daily foot monitoring.  Patient will continue efforts proper glycemic control, lowering hemoglobin A1c, adherence to diabetic medication regimen.  Discussed with patient diabetic neuropathy.  Patient will continue with oral gabapentin.    5. Patient will follow-up in 3-4 months or p.r.n. for complaints        Bonnie Mendoza DPM  [address] [address] [address]  [phone]      This note was created using [employer] direct voice recognition software. Note may have occasional typographical errors that may not have been  identified and edited despite initial review prior to signing.

## 2025-05-29 ENCOUNTER — OFFICE VISIT (OUTPATIENT)
Dept: PODIATRY | Facility: CLINIC | Age: 70
End: 2025-05-29
Payer: MEDICARE

## 2025-05-29 VITALS — BODY MASS INDEX: 32.45 KG/M2 | HEIGHT: 63 IN

## 2025-05-29 DIAGNOSIS — E11.49 TYPE II DIABETES MELLITUS WITH NEUROLOGICAL MANIFESTATIONS: ICD-10-CM

## 2025-05-29 DIAGNOSIS — L60.9 DISEASE OF NAIL: Primary | ICD-10-CM

## 2025-05-29 DIAGNOSIS — L84 CORN OR CALLUS: ICD-10-CM

## 2025-05-29 NOTE — PROGRESS NOTES
Subjective:     Patient ID: Jo Ann Hummel is a 70 y.o. female    Chief Complaint: Nail Care       Jo Ann is a 70 y.o. female who presents to the clinic for evaluation and treatment of high risk feet. Jo nAn has a past medical history of Personal history of colonic polyps. The patient's chief complaint is long, thick toenails. This patient has documented high risk feet requiring routine maintenance secondary to diabetes mellitis and those secondary complications of diabetes, as mentioned..    PCP: Cristi Locke MD    Date Last Seen by PCP: 12/04/2024    Current shoe gear:  Affected Foot: Casual shoes     Unaffected Foot: Casual shoes    Hemoglobin A1C   Date Value Ref Range Status   02/29/2024 5.8 (H) <5.7 % Final     Comment:       Prediabetic: 5.7 - 6.4 %  Diabetic: > 6.4 %   01/06/2023 6.0 5.7 - 6.4 % Final       Review of Systems   Constitutional: Negative.  Negative for chills and fever.   Respiratory:  Negative for cough and shortness of breath.    Cardiovascular:  Negative for chest pain and leg swelling.   Gastrointestinal:  Negative for diarrhea, nausea and vomiting.   Neurological:  Positive for tingling.        Objective:   Physical Exam  Vitals reviewed.   Constitutional:       General: She is not in acute distress.     Appearance: Normal appearance. She is not ill-appearing.   HENT:      Head: Normocephalic.      Nose: Nose normal.   Cardiovascular:      Pulses:           Dorsalis pedis pulses are 1+ on the right side and 1+ on the left side.        Posterior tibial pulses are 1+ on the right side and 1+ on the left side.   Pulmonary:      Effort: Pulmonary effort is normal. No respiratory distress.   Feet:      Right foot:      Skin integrity: Callus (plantar right 3rd MPJ) present.      Left foot:      Skin integrity: Callus (Plantar left 2nd and 5th MPJ) present.   Skin:     Capillary Refill: Capillary refill takes 2 to 3 seconds.   Neurological:      Mental Status: She is alert and  oriented to person, place, and time.   Psychiatric:         Mood and Affect: Mood normal.         Behavior: Behavior normal.         Thought Content: Thought content normal.         Judgment: Judgment normal.        Foot Exam    General  General Appearance: appears stated age and healthy   Orientation: alert and oriented to person, place, and time   Affect: appropriate   Gait: antalgic       Right Foot/Ankle     Inspection and Palpation  Tenderness: lesser metatarsophalangeal joints   Swelling: dorsum   Hammertoes: fifth toe and fourth toe  Skin Exam: callus (plantar right 3rd MPJ), skin changes and abnormal color;     Neurovascular  Dorsalis pedis: 1+  Posterior tibial: 1+    Muscle Strength  Ankle dorsiflexion: 5  Ankle plantar flexion: 5  Ankle inversion: 5  Ankle eversion: 5  Great toe extension: 5  Great toe flexion: 5      Left Foot/Ankle      Inspection and Palpation  Tenderness: lesser metatarsophalangeal joints   Swelling: dorsum   Hammertoes: fifth toe and fourth toe  Skin Exam: callus (Plantar left 2nd and 5th MPJ), skin changes and abnormal color;     Neurovascular  Dorsalis pedis: 1+  Posterior tibial: 1+    Muscle Strength  Ankle dorsiflexion: 5  Ankle plantar flexion: 5  Ankle inversion: 5  Ankle eversion: 5  Great toe extension: 5  Great toe flexion: 5      Vascular:  Pedal hair growth is absent to the digits bilateral lower extremity, skin temperature gradient warm to cool from proximal distal bilateral lower extremity, mild atrophic soft tissue skin changes noted bilateral foot,  mild edema noted bilateral foot  Neurologic:  Positive paresthesias reported, positive burning reported  Dermatologic:  Nails 1 through 5 bilateral are severely discolored, thickened and elongated.  well-healed surgical scar from previous bunion correction right 1st MPJ      Assessment:         1. Disease of nail    2. Type II diabetes mellitus with neurological manifestations    3. Corn or callus       Plan:     Jo Ann  Shaheed was seen today for   Chief Complaint   Patient presents with    Nail Care       Assessment & Plan           Routine Foot Care    Date/Time: 5/29/2025 11:30 AM    Performed by: Bonnie Mendoza DPM  Authorized by: Bonnie Mendoza DPM    Consent Done?:  Yes (Verbal)  Hyperkeratotic Skin Lesions?: Yes    Number of trimmed lesions:  3  Location(s):  Left 2nd Metatarsal Head, Left 5th Metatarsal Head and Right 3rd Metatarsal Head    Nail Care Type:  Debride  Location(s): All  (Left 1st Toe, Left 3rd Toe, Left 2nd Toe, Left 4th Toe, Left 5th Toe, Right 1st Toe, Right 2nd Toe, Right 3rd Toe, Right 4th Toe and Right 5th Toe)  Patient tolerance:  Patient tolerated the procedure well with no immediate complications       1. Patient was examined and evaluated.    2. Discussed with patient etiology of callus formation.  Patient was warned of potential recurrence over time.  Patient was dispensed offloading pads for reduction of direct contact to the lesion.  Patient was advised to perform safe debridement at home with a emery board, nail file or pumice stone.  Patient was advised to apply ointments / moisturizer to the area for softening purposes.  3. Patient was advised to continue with daily foot monitoring.  Patient will continue efforts proper glycemic control, lowering hemoglobin A1c, adherence to diabetic medication regimen  4. Discussed with patient the etiology of nail fungus and as possible secondary issue to prior nail trauma.  Discussed with patient OTC topical conservative treatments such as Vicks vapor rub, tea tree oil as well as coconut oil.  Discussed with patient risks and benefits oral Lamisil therapy.   5. Patient will follow-up in 3-4 months or p.r.n. for complaints      Bonnie Mendoza DPM  [address] [address] [address]  [phone]      This note was created using [employer] direct voice recognition software. Note may have occasional typographical errors that may not  have been identified and edited despite initial review prior to signing.